# Patient Record
Sex: FEMALE | Race: OTHER | NOT HISPANIC OR LATINO | URBAN - METROPOLITAN AREA
[De-identification: names, ages, dates, MRNs, and addresses within clinical notes are randomized per-mention and may not be internally consistent; named-entity substitution may affect disease eponyms.]

---

## 2017-07-19 ENCOUNTER — APPOINTMENT (RX ONLY)
Dept: URBAN - METROPOLITAN AREA CLINIC 142 | Facility: CLINIC | Age: 72
Setting detail: DERMATOLOGY
End: 2017-07-19

## 2017-07-19 DIAGNOSIS — L82.0 INFLAMED SEBORRHEIC KERATOSIS: ICD-10-CM

## 2017-07-19 DIAGNOSIS — L82.1 OTHER SEBORRHEIC KERATOSIS: ICD-10-CM

## 2017-07-19 DIAGNOSIS — R20.2 PARESTHESIA OF SKIN: ICD-10-CM

## 2017-07-19 DIAGNOSIS — D18.0 HEMANGIOMA: ICD-10-CM

## 2017-07-19 PROBLEM — D18.01 HEMANGIOMA OF SKIN AND SUBCUTANEOUS TISSUE: Status: ACTIVE | Noted: 2017-07-19

## 2017-07-19 PROCEDURE — ? TREATMENT REGIMEN

## 2017-07-19 PROCEDURE — 17110 DESTRUCTION B9 LES UP TO 14: CPT

## 2017-07-19 PROCEDURE — ? LIQUID NITROGEN

## 2017-07-19 PROCEDURE — ? COUNSELING

## 2017-07-19 PROCEDURE — 99214 OFFICE O/P EST MOD 30 MIN: CPT | Mod: 25

## 2017-07-19 ASSESSMENT — ITCH INTENSITY: HOW SEVERE IS YOUR ITCHING?: 7

## 2017-07-19 ASSESSMENT — LOCATION DETAILED DESCRIPTION DERM
LOCATION DETAILED: RIGHT SUPERIOR PARIETAL SCALP
LOCATION DETAILED: UPPER STERNUM
LOCATION DETAILED: RIGHT DISTAL POSTERIOR UPPER ARM
LOCATION DETAILED: LEFT PROXIMAL POSTERIOR UPPER ARM
LOCATION DETAILED: SUPERIOR THORACIC SPINE
LOCATION DETAILED: INFERIOR THORACIC SPINE
LOCATION DETAILED: RIGHT ANTERIOR PROXIMAL THIGH
LOCATION DETAILED: LEFT ANTERIOR DISTAL UPPER ARM
LOCATION DETAILED: LEFT MEDIAL UPPER BACK
LOCATION DETAILED: RIGHT ANTERIOR DISTAL THIGH
LOCATION DETAILED: SUBXIPHOID
LOCATION DETAILED: LEFT MEDIAL EYEBROW
LOCATION DETAILED: LEFT ANTERIOR MEDIAL DISTAL THIGH
LOCATION DETAILED: LEFT ANTERIOR PROXIMAL THIGH
LOCATION DETAILED: RIGHT ANTERIOR PROXIMAL UPPER ARM
LOCATION DETAILED: RIGHT INFERIOR MEDIAL MIDBACK

## 2017-07-19 ASSESSMENT — LOCATION ZONE DERM
LOCATION ZONE: SCALP
LOCATION ZONE: FACE
LOCATION ZONE: TRUNK
LOCATION ZONE: ARM
LOCATION ZONE: LEG

## 2017-07-19 ASSESSMENT — LOCATION SIMPLE DESCRIPTION DERM
LOCATION SIMPLE: LEFT THIGH
LOCATION SIMPLE: ABDOMEN
LOCATION SIMPLE: CHEST
LOCATION SIMPLE: RIGHT LOWER BACK
LOCATION SIMPLE: RIGHT THIGH
LOCATION SIMPLE: LEFT UPPER BACK
LOCATION SIMPLE: RIGHT POSTERIOR UPPER ARM
LOCATION SIMPLE: LEFT EYEBROW
LOCATION SIMPLE: LEFT UPPER ARM
LOCATION SIMPLE: SCALP
LOCATION SIMPLE: RIGHT UPPER ARM
LOCATION SIMPLE: UPPER BACK
LOCATION SIMPLE: LEFT POSTERIOR UPPER ARM

## 2017-07-19 NOTE — HPI: NON-MELANOMA SKIN CANCER F/U (HISTORY OF NMSC)
What Is The Reason For Today's Visit?: Follow Up Non-Melanoma Skin Cancer
How Many Skin Cancers Have You Had?: one
When Was Your Last Cancer Diagnosed?: 2007

## 2017-07-19 NOTE — PROCEDURE: TREATMENT REGIMEN
Plan: TW discussed if becomes symtomatic can remove with laser
Detail Level: Zone
Continue Regimen: Cera Ve Anti itch

## 2017-07-19 NOTE — PROCEDURE: LIQUID NITROGEN
Post-Care Instructions: I reviewed with the patient in detail post-care instructions. Patient is to wear sunprotection, and avoid picking at any of the treated lesions. Pt may apply Vaseline to crusted or scabbing areas.
Medical Necessity Information: It is in your best interest to select a reason for this procedure from the list below. All of these items fulfill various CMS LCD requirements except the new and changing color options.
Include Z78.9 (Other Specified Conditions Influencing Health Status) As An Associated Diagnosis?: No
Duration Of Freeze Thaw-Cycle (Seconds): 10
Medical Necessity Clause: This procedure was medically necessary because the lesions that were treated were:
Consent: The patient's verbal consent was obtained including but not limited to risks of crusting, scabbing, blistering, scarring, darker or lighter pigmentary change, recurrence, incomplete removal and infection.
Number Of Freeze-Thaw Cycles: 1 freeze-thaw cycle
Detail Level: Detailed

## 2018-06-14 ENCOUNTER — HOSPITAL ENCOUNTER (EMERGENCY)
Age: 73
Discharge: HOME | End: 2018-06-14
Payer: MEDICARE

## 2018-06-14 VITALS — BODY MASS INDEX: 31.6 KG/M2

## 2018-06-14 VITALS
SYSTOLIC BLOOD PRESSURE: 121 MMHG | DIASTOLIC BLOOD PRESSURE: 70 MMHG | OXYGEN SATURATION: 94 % | RESPIRATION RATE: 20 BRPM | HEART RATE: 100 BPM

## 2018-06-14 VITALS
OXYGEN SATURATION: 95 % | DIASTOLIC BLOOD PRESSURE: 70 MMHG | SYSTOLIC BLOOD PRESSURE: 121 MMHG | TEMPERATURE: 99 F | RESPIRATION RATE: 19 BRPM | HEART RATE: 98 BPM

## 2018-06-14 VITALS
DIASTOLIC BLOOD PRESSURE: 76 MMHG | RESPIRATION RATE: 95 BRPM | TEMPERATURE: 98.96 F | OXYGEN SATURATION: 97 % | HEART RATE: 111 BPM | SYSTOLIC BLOOD PRESSURE: 145 MMHG

## 2018-06-14 VITALS
SYSTOLIC BLOOD PRESSURE: 124 MMHG | HEART RATE: 112 BPM | DIASTOLIC BLOOD PRESSURE: 76 MMHG | OXYGEN SATURATION: 97 % | RESPIRATION RATE: 16 BRPM

## 2018-06-14 VITALS
DIASTOLIC BLOOD PRESSURE: 76 MMHG | SYSTOLIC BLOOD PRESSURE: 145 MMHG | RESPIRATION RATE: 95 BRPM | TEMPERATURE: 99 F | HEART RATE: 111 BPM

## 2018-06-14 VITALS
SYSTOLIC BLOOD PRESSURE: 145 MMHG | TEMPERATURE: 98.96 F | DIASTOLIC BLOOD PRESSURE: 76 MMHG | HEART RATE: 111 BPM | RESPIRATION RATE: 95 BRPM

## 2018-06-14 DIAGNOSIS — R07.89: Primary | ICD-10-CM

## 2018-06-14 LAB
ADD MANUAL DIFF / SLIDE REVIEW: NO
ALBUMIN SERPL-MCNC: 4.3 G/DL (ref 3.5–5)
ALBUMIN/GLOB SERPL: 1.3 {RATIO} (ref 1–2.8)
ALP SERPL-CCNC: 87 U/L (ref 38–126)
ALT SERPL-CCNC: 22 IU/L (ref 9–52)
BUN SERPL-MCNC: 11 MG/DL (ref 7–17)
CALCIUM SERPL-MCNC: 9.5 MG/DL (ref 8.4–10.2)
CHLORIDE SERPL-SCNC: 103 MMOL/L (ref 98–107)
CK SERPL-CCNC: 49 U/L (ref 30–135)
CO2 SERPL-SCNC: 26 MMOL/L (ref 22–32)
ESTIMATED GLOMERULAR FILT RATE: > 60 ML/MIN (ref 60–?)
GLOBULIN SER CALC-MCNC: 3.2 G/DL (ref 1.7–4.1)
GLUCOSE SERPL-MCNC: 171 MG/DL (ref 80–110)
HEMATOCRIT: 36.2 % (ref 36–46)
HEMOGLOBIN: 12.4 G/DL (ref 12–16)
HEMOLYSIS: < 15 (ref 0–50)
LIPASE SERPL-CCNC: 88 U/L (ref 23–300)
MCV RBC: 88.7 FL (ref 80–100)
MEAN CORPUSCULAR HEMOGLOBIN: 30.4 PG (ref 26–34)
MEAN CORPUSCULAR HGB CONC: 34.3 % (ref 30–36)
PLATELET COUNT: 394 X10^3/UL (ref 150–400)
POTASSIUM SERPL-SCNC: 3.6 MMOL/L (ref 3.4–5.1)
PROT SERPL-MCNC: 7.5 G/DL (ref 6.3–8.2)
SODIUM SERPL-SCNC: 143 MMOL/L (ref 137–145)
TROPONIN I SERPL-MCNC: < 0.012 NG/ML (ref 0.01–0.03)

## 2018-06-14 PROCEDURE — 93005 ELECTROCARDIOGRAM TRACING: CPT

## 2018-06-14 PROCEDURE — 36591 DRAW BLOOD OFF VENOUS DEVICE: CPT

## 2018-06-14 PROCEDURE — 84484 ASSAY OF TROPONIN QUANT: CPT

## 2018-06-14 PROCEDURE — 80053 COMPREHEN METABOLIC PANEL: CPT

## 2018-06-14 PROCEDURE — 82550 ASSAY OF CK (CPK): CPT

## 2018-06-14 PROCEDURE — 85025 COMPLETE CBC W/AUTO DIFF WBC: CPT

## 2018-06-14 PROCEDURE — 82553 CREATINE MB FRACTION: CPT

## 2018-06-14 PROCEDURE — 71045 X-RAY EXAM CHEST 1 VIEW: CPT

## 2018-06-14 PROCEDURE — 99283 EMERGENCY DEPT VISIT LOW MDM: CPT

## 2018-06-14 PROCEDURE — 99285 EMERGENCY DEPT VISIT HI MDM: CPT

## 2018-06-14 PROCEDURE — 83690 ASSAY OF LIPASE: CPT

## 2018-06-14 PROCEDURE — 85379 FIBRIN DEGRADATION QUANT: CPT

## 2018-06-14 NOTE — ED_ITS
"HPI - Chest Pain    
General    
Chief Complaint: Chest Pain    
Stated Complaint: chest pain    
Time Seen by Provider: 06/14/18 19:51    
Source: patient    
Mode of arrival: ambulatory    
Limitations: no limitations    
History of Present Illness    
HPI narrative:   The patient is here with left upper chest pain that started   
this morning.  There is some  discomfort with inspiration and motion.  She has   
a history of a right pulmonary embolism diagnosed April 2018.  She takes   
Coumadin daily.  She traveled here to be with family, she lives in [place].  
  She has been here about 1 week.  She has no recent illness.  She has had no   
cough, fever or congestion.  She has no history of CAD.  She denies calf pain   
or peripheral edema.  She has no hemoptysis.  She is a nonsmoker, she has no   
underlying pulmonary disease.    
Related Data    
 Home Medications    
    
    
    
 Medication  Instructions  Recorded  Confirmed    
     
amitriptyline 10 mg tablet 10 mg PO BEDTIME 06/08/18 06/14/18    
     
glimepiride 2 mg tablet 1 mg PO BID 06/08/18 06/14/18    
     
metformin 500 mg tablet 500 mg PO BID 06/08/18 06/14/18    
     
tramadol 50 mg tablet 50 mg PO 1-2XD PRN 06/08/18 06/14/18    
     
warfarin 5 mg tablet 5 mg PO DAILY 06/08/18 06/14/18    
    
    
    
 Allergies    
    
    
    
Allergy/AdvReac Type Severity Reaction Status Date / Time    
     
erythromycin base Allergy Severe Abdominal Verified 06/14/18 20:04    
    
[From Erythrocin]   Pain      
     
niacin Allergy Severe Abdominal Verified 06/14/18 20:04    
    
   Pain      
     
alprazolam AdvReac Severe Depression Verified 06/14/18 20:04    
     
amitriptyline AdvReac Severe Palpitation Verified 06/14/18 20:04    
    
   s      
     
atorvastatin AdvReac Severe Muscle Pain Verified 06/14/18 20:04    
     
codeine AdvReac Severe Hallucinati Verified 06/14/18 20:04    
    
   ng      
     
duloxetine [From Cymbalta] AdvReac Severe Nausea Verified 06/14/18 20:04    
     
estrogens, conjugated AdvReac Severe Abdominal Verified 06/14/18 20:04    
    
[From Premarin]   Pain      
     
fexofenadine [From Allegra-D] AdvReac Severe Headache Verified 06/14/18 20:04    
     
levofloxacin AdvReac Severe Dizziness Verified 06/14/18 20:04    
     
lisinopril AdvReac Severe Cough Verified 06/14/18 20:04    
     
loratadine [From Claritin-D] AdvReac Severe Insomnia Verified 06/14/18 20:04    
     
losartan [From Cozaar] AdvReac Severe Headache Verified 06/14/18 20:04    
     
mometasone furoate AdvReac Severe Muscle Pain Verified 06/14/18 20:04    
    
[From Asmanex Twisthaler]         
     
NSAIDS (Non-Steroidal AdvReac Severe Abdominal Verified 06/14/18 20:04    
    
Anti-Inflamma   Pain      
     
pseudoephedrine AdvReac Severe Headache Verified 06/14/18 20:04    
    
[From Allegra-D]         
     
salmeterol AdvReac Severe Muscle Pain Verified 06/14/18 20:04    
    
[From Advair Diskus]         
     
fluticasone AdvReac Unknown  Verified 06/14/18 20:04    
     
prednisone AdvReac Unknown  Verified 06/14/18 20:04    
    
    
    
    
Review of Systems    
Review of Systems    
All systems reviewed & are unremarkable except as noted in HPI and below     
Constitutional    
Denies chills, Denies excessive sweating, Denies fatigue, Denies fever(s),   
Denies lethargy and Denies weakness    
ENT    
Ears, Nose, Mouth, and Throat: Denies change in voice, Denies neck pain and   
Denies sore throat    
Cardiovascular    
Reports chest pain at rest, Denies irregular heart rhythm, Denies   
lightheadedness, Denies palpitations, Denies dyspnea, Denies dyspnea on   
exertion and Denies orthopnea    
Respiratory    
Denies chest congestion, Denies cough, Denies dyspnea, Denies dyspnea on   
exertion and Denies wheezing    
Gastrointestinal    
Gastrointestinal: Denies abdominal pain, Denies change in bowel habits
[id number]|2018-06-14 22:31:00|2018-06-14 22:31:00|ED_ITS|[facility]|Emergency Department|[id number]|"HPI - Back Pain/Injury

## 2018-06-14 NOTE — ED.CHESTPAIN
"HPI - Chest Pain
General
Chief Complaint: Chest Pain
Stated Complaint: chest pain
Time Seen by Provider: 06/14/18 19:51
Source: patient
Mode of arrival: ambulatory
Limitations: no limitations
History of Present Illness
HPI narrative:   The patient is here with left upper chest pain that started this morning.  There is some  discomfort with inspiration and motion.  She has a history of a right pulmonary embolism diagnosed April 2018.  She takes Coumadin daily.  She 
traveled here to be with family, she lives in [place].  She has been here about 1 week.  She has no recent illness.  She has had no cough, fever or congestion.  She has no history of CAD.  She denies calf pain or peripheral edema.  She has no 
hemoptysis.  She is a nonsmoker, she has no underlying pulmonary disease.
Related Data
Home Medications

 Medication  Instructions  Recorded  Confirmed
amitriptyline 10 mg tablet 10 mg PO BEDTIME 06/08/18 06/14/18
glimepiride 2 mg tablet 1 mg PO BID 06/08/18 06/14/18
metformin 500 mg tablet 500 mg PO BID 06/08/18 06/14/18
tramadol 50 mg tablet 50 mg PO 1-2XD PRN 06/08/18 06/14/18
warfarin 5 mg tablet 5 mg PO DAILY 06/08/18 06/14/18


Allergies

Allergy/AdvReac Type Severity Reaction Status Date / Time
erythromycin base Allergy Severe Abdominal Verified 06/14/18 20:04
[From Erythrocin]   Pain  
niacin Allergy Severe Abdominal Verified 06/14/18 20:04
   Pain  
alprazolam AdvReac Severe Depression Verified 06/14/18 20:04
amitriptyline AdvReac Severe Palpitation Verified 06/14/18 20:04
   s  
atorvastatin AdvReac Severe Muscle Pain Verified 06/14/18 20:04
codeine AdvReac Severe Hallucinati Verified 06/14/18 20:04
   ng  
duloxetine [From Cymbalta] AdvReac Severe Nausea Verified 06/14/18 20:04
estrogens, conjugated AdvReac Severe Abdominal Verified 06/14/18 20:04
[From Premarin]   Pain  
fexofenadine [From Allegra-D] AdvReac Severe Headache Verified 06/14/18 20:04
levofloxacin AdvReac Severe Dizziness Verified 06/14/18 20:04
lisinopril AdvReac Severe Cough Verified 06/14/18 20:04
loratadine [From Claritin-D] AdvReac Severe Insomnia Verified 06/14/18 20:04
losartan [From Cozaar] AdvReac Severe Headache Verified 06/14/18 20:04
mometasone furoate AdvReac Severe Muscle Pain Verified 06/14/18 20:04
[From Asmanex Twisthaler]     
NSAIDS (Non-Steroidal AdvReac Severe Abdominal Verified 06/14/18 20:04
Anti-Inflamma   Pain  
pseudoephedrine AdvReac Severe Headache Verified 06/14/18 20:04
[From Allegra-D]     
salmeterol AdvReac Severe Muscle Pain Verified 06/14/18 20:04
[From Advair Diskus]     
fluticasone AdvReac Unknown  Verified 06/14/18 20:04
prednisone AdvReac Unknown  Verified 06/14/18 20:04



Review of Systems
Review of Systems
All systems reviewed & are unremarkable except as noted in HPI and below 
Constitutional
Denies chills, Denies excessive sweating, Denies fatigue, Denies fever(s), Denies lethargy and Denies weakness
ENT
Ears, Nose, Mouth, and Throat: Denies change in voice, Denies neck pain and Denies sore throat
Cardiovascular
Reports chest pain at rest, Denies irregular heart rhythm, Denies lightheadedness, Denies palpitations, Denies dyspnea, Denies dyspnea on exertion and Denies orthopnea
Respiratory
Denies chest congestion, Denies cough, Denies dyspnea, Denies dyspnea on exertion and Denies wheezing
Gastrointestinal
Gastrointestinal: Denies abdominal pain, Denies change in bowel habits, Denies diarrhea, Denies nausea and Denies vomiting
Musculoskeletal
Denies myalgias, Denies limited range of motion and Denies neck pain
Integumentary/Breasts
Denies pruritus, Denies erythema, Denies rash and Denies wounds
Neurologic
Denies weakness
Endocrine
Denies excessive sweating, Denies fatigue and Denies palpitations
Allergic/Immunologic
Denies wheezing

[facility]
Medical History (Reviewed 06/14/18 @ 20:54 by Nir Mejia MD)

Diabetes (Acute)
H/O: hysterectomy (Acute)
Insomnia (Acute)
Pulmonary embolism (Acute ~04/2018)
Right ankle sprain
[id number]|2018-06-14 19:35:00|2018-06-14 20:12:00|DI.RAD.S_ITS|[facility]|Imaging|[id number]|"PROCEDURE:  XR CHEST 1V

## 2018-07-23 ENCOUNTER — HOSPITAL ENCOUNTER (OUTPATIENT)
Age: 73
End: 2018-07-23
Payer: MEDICARE

## 2018-07-23 DIAGNOSIS — D68.59: Primary | ICD-10-CM

## 2018-07-23 LAB
INR PPP: 2.2 (ref 0.9–1.3)
PROTHROMBIN TIME: 23.9 SECONDS (ref 10.1–12.7)

## 2018-07-23 PROCEDURE — 85610 PROTHROMBIN TIME: CPT

## 2018-07-23 PROCEDURE — 36415 COLL VENOUS BLD VENIPUNCTURE: CPT
